# Patient Record
Sex: MALE | URBAN - METROPOLITAN AREA
[De-identification: names, ages, dates, MRNs, and addresses within clinical notes are randomized per-mention and may not be internally consistent; named-entity substitution may affect disease eponyms.]

---

## 2023-06-28 ENCOUNTER — OFFICE VISIT (OUTPATIENT)
Dept: PRIMARY CARE | Facility: CLINIC | Age: 33
End: 2023-06-28
Payer: COMMERCIAL

## 2023-06-28 VITALS
OXYGEN SATURATION: 98 % | DIASTOLIC BLOOD PRESSURE: 70 MMHG | HEIGHT: 76 IN | TEMPERATURE: 97.1 F | HEART RATE: 75 BPM | SYSTOLIC BLOOD PRESSURE: 126 MMHG | WEIGHT: 193 LBS | BODY MASS INDEX: 23.5 KG/M2

## 2023-06-28 DIAGNOSIS — Z20.2 POSSIBLE EXPOSURE TO STD: ICD-10-CM

## 2023-06-28 DIAGNOSIS — Z00.00 ANNUAL PHYSICAL EXAM: Primary | ICD-10-CM

## 2023-06-28 LAB
ERYTHROCYTE DISTRIBUTION WIDTH (RATIO) BY AUTOMATED COUNT: 14 % (ref 11.5–14.5)
ERYTHROCYTE MEAN CORPUSCULAR HEMOGLOBIN CONCENTRATION (G/DL) BY AUTOMATED: 31.6 G/DL (ref 32–36)
ERYTHROCYTE MEAN CORPUSCULAR VOLUME (FL) BY AUTOMATED COUNT: 88 FL (ref 80–100)
ERYTHROCYTES (10*6/UL) IN BLOOD BY AUTOMATED COUNT: 5.83 X10E12/L (ref 4.5–5.9)
HEMATOCRIT (%) IN BLOOD BY AUTOMATED COUNT: 51.3 % (ref 41–52)
HEMOGLOBIN (G/DL) IN BLOOD: 16.2 G/DL (ref 13.5–17.5)
LEUKOCYTES (10*3/UL) IN BLOOD BY AUTOMATED COUNT: 13.9 X10E9/L (ref 4.4–11.3)
NRBC (PER 100 WBCS) BY AUTOMATED COUNT: 0 /100 WBC (ref 0–0)
PLATELETS (10*3/UL) IN BLOOD AUTOMATED COUNT: 259 X10E9/L (ref 150–450)

## 2023-06-28 PROCEDURE — 80053 COMPREHEN METABOLIC PANEL: CPT

## 2023-06-28 PROCEDURE — 86592 SYPHILIS TEST NON-TREP QUAL: CPT

## 2023-06-28 PROCEDURE — 80074 ACUTE HEPATITIS PANEL: CPT

## 2023-06-28 PROCEDURE — 85027 COMPLETE CBC AUTOMATED: CPT

## 2023-06-28 PROCEDURE — 99385 PREV VISIT NEW AGE 18-39: CPT | Performed by: FAMILY MEDICINE

## 2023-06-28 PROCEDURE — 80061 LIPID PANEL: CPT

## 2023-06-28 PROCEDURE — 87389 HIV-1 AG W/HIV-1&-2 AB AG IA: CPT

## 2023-06-28 SDOH — HEALTH STABILITY: PHYSICAL HEALTH: ON AVERAGE, HOW MANY MINUTES DO YOU ENGAGE IN EXERCISE AT THIS LEVEL?: 60 MIN

## 2023-06-28 SDOH — HEALTH STABILITY: PHYSICAL HEALTH: ON AVERAGE, HOW MANY DAYS PER WEEK DO YOU ENGAGE IN MODERATE TO STRENUOUS EXERCISE (LIKE A BRISK WALK)?: 3 DAYS

## 2023-06-28 ASSESSMENT — PAIN SCALES - GENERAL: PAINLEVEL: 0-NO PAIN

## 2023-06-28 NOTE — PROGRESS NOTES
"Subjective   Patient ID: Ab Guiod is a 32 y.o. male who presents for New Patient Visit (Establish care with pcp, pt is fasting. ).    HPI   Lives in [facility]  Diet not great  Review of Systems  Cardiovascular: no chest pain, no edema, no palpitations, and no syncope.   Respiratory: no cough,no shortness of breath, and no wheezing  Gastrointestinal: no abdominal pain, nausea, vomiting or blood in stools  Genitourinary: no dysuria or hematuria  Objective   /70 (BP Location: Left arm, Patient Position: Sitting, BP Cuff Size: Adult)   Pulse 75   Temp 36.2 °C (97.1 °F) (Temporal)   Ht 1.93 m (6' 4\")   Wt 87.5 kg (193 lb)   SpO2 98%   BMI 23.49 kg/m²     Physical Exam  General: Alert, pleasant and in no acute distress  HEENT: Extraocular motor intact, PERRLA  Neck: supple, nontender, no palpable or enlarged nodes, no thyromegaly  Heart: Regular rate and rhythm, no murmurs  Lungs: Clear to auscultation with good air exchange  Chest- symmetric, nontender  Abdomen: Soft, nontender, no palpable mass or organomegaly  Lower extremities: No edema  Neuro: CNs 2-12 grossly intact, full sensation and strength in all extremities  Skin: No atypical rashes or lesions  Assessment/Plan   Problem List Items Addressed This Visit    None  Visit Diagnoses       Annual physical exam    -  Primary    Relevant Orders    Comprehensive Metabolic Panel    CBC    Lipid Panel    HIV 1/2 Antigen/Antibody Screen with Reflex to Confirmation    RPR    Hepatitis panel, acute    Possible exposure to STD        Relevant Orders    HIV 1/2 Antigen/Antibody Screen with Reflex to Confirmation    RPR    Hepatitis panel, acute        Patient here to establish.  We completed an annual checkup.  Examination normal.  We will get routine labs.  In addition, patient would like STD testing.  He has no symptoms so we will get HIV, hep ABC, and RPR.  We will contact patient in the next 3 to 5 days with results.  Encourage patient to " try to establish a better diet.  [place] good choices.  We discussed avenues to success.

## 2023-06-29 LAB
ALANINE AMINOTRANSFERASE (SGPT) (U/L) IN SER/PLAS: 24 U/L (ref 10–52)
ALBUMIN (G/DL) IN SER/PLAS: 4.3 G/DL (ref 3.4–5)
ALKALINE PHOSPHATASE (U/L) IN SER/PLAS: 54 U/L (ref 33–120)
ANION GAP IN SER/PLAS: 14 MMOL/L (ref 10–20)
ASPARTATE AMINOTRANSFERASE (SGOT) (U/L) IN SER/PLAS: 14 U/L (ref 9–39)
BILIRUBIN TOTAL (MG/DL) IN SER/PLAS: 0.5 MG/DL (ref 0–1.2)
CALCIUM (MG/DL) IN SER/PLAS: 10.2 MG/DL (ref 8.6–10.6)
CARBON DIOXIDE, TOTAL (MMOL/L) IN SER/PLAS: 28 MMOL/L (ref 21–32)
CHLORIDE (MMOL/L) IN SER/PLAS: 103 MMOL/L (ref 98–107)
CHOLESTEROL (MG/DL) IN SER/PLAS: 186 MG/DL (ref 0–199)
CHOLESTEROL IN HDL (MG/DL) IN SER/PLAS: 65.1 MG/DL
CHOLESTEROL/HDL RATIO: 2.9
CREATININE (MG/DL) IN SER/PLAS: 1.19 MG/DL (ref 0.5–1.3)
GFR MALE: 83 ML/MIN/1.73M2
GLUCOSE (MG/DL) IN SER/PLAS: 86 MG/DL (ref 74–99)
HEPATITIS A VIRUS IGM AB PRESENCE IN SER/PLAS BY IMMUNOASSAY: NONREACTIVE
HEPATITIS B VIRUS CORE IGM AB PRESENCE IN SER/PLAS BY IMMUNOASSY: NONREACTIVE
HEPATITIS B VIRUS SURFACE AG PRESENCE IN SERUM: NONREACTIVE
HEPATITIS C VIRUS AB PRESENCE IN SERUM: NONREACTIVE
HIV 1/ 2 AG/AB SCREEN: NONREACTIVE
LDL: 89 MG/DL (ref 0–99)
POTASSIUM (MMOL/L) IN SER/PLAS: 4.4 MMOL/L (ref 3.5–5.3)
PROTEIN TOTAL: 7.7 G/DL (ref 6.4–8.2)
RPR MONITORING: NONREACTIVE
SODIUM (MMOL/L) IN SER/PLAS: 141 MMOL/L (ref 136–145)
TRIGLYCERIDE (MG/DL) IN SER/PLAS: 162 MG/DL (ref 0–149)
UREA NITROGEN (MG/DL) IN SER/PLAS: 21 MG/DL (ref 6–23)
VLDL: 32 MG/DL (ref 0–40)

## 2023-12-04 ENCOUNTER — APPOINTMENT (OUTPATIENT)
Dept: PRIMARY CARE | Facility: CLINIC | Age: 33
End: 2023-12-04
Payer: COMMERCIAL

## 2023-12-15 ENCOUNTER — OFFICE VISIT (OUTPATIENT)
Dept: UROLOGY | Facility: HOSPITAL | Age: 33
End: 2023-12-15
Payer: COMMERCIAL

## 2023-12-15 DIAGNOSIS — N41.1 CHRONIC PROSTATITIS: Primary | ICD-10-CM

## 2023-12-15 PROCEDURE — 99214 OFFICE O/P EST MOD 30 MIN: CPT | Performed by: UROLOGY

## 2023-12-15 PROCEDURE — 99204 OFFICE O/P NEW MOD 45 MIN: CPT | Performed by: UROLOGY

## 2023-12-15 RX ORDER — DOXYCYCLINE HYCLATE 100 MG
100 TABLET ORAL 2 TIMES DAILY
Qty: 60 TABLET | Refills: 0 | Status: SHIPPED | OUTPATIENT
Start: 2023-12-15 | End: 2024-01-14

## 2023-12-15 NOTE — PROGRESS NOTES
HPI    33 year old male coming in for 6 weeks of so of perineal discomfort, pain at tip of penis, pain in testicles. Never happened before. No inciting factors. Maybe some increased frequency, but no signficiant bothersome LUTS. No infections or hematuria. Went to urgent care, urines and labs were normal per reprot.         Current Medications:  No current outpatient medications on file.     No current facility-administered medications for this visit.        Active Problems:  Ab Guido is a 33 y.o. male with the following Problems and Medications.  There is no problem list on file for this patient.    No current outpatient medications on file.     No current facility-administered medications for this visit.       PMH:  No past medical history on file.    PSH:  No past surgical history on file.    FMH:  Family History   Problem Relation Name Age of Onset    Breast cancer Mother      Heart disease Maternal Grandmother      COPD Paternal Grandmother      Heart disease Paternal Grandmother      Diabetes Other         SHx:  Social History     Tobacco Use    Smoking status: Some Days     Types: Cigarettes    Smokeless tobacco: Never   Substance Use Topics    Alcohol use: Yes     Comment: Social    Drug use: Never       Allergies:  No Known Allergies      Physical Exam:  General: Well developed, well nourished, alert and cooperative, appears in no acute distress   Eyes: Non-injected conjunctiva, sclera clear, no proptosis   Cardiac: Extremities appear well perfused. No edema, cyanosis or pallor.    Lungs: Breathing is easy, non-labored. Speaking in clear and complete sentences. Normal diaphragmatic movement.   MSK: Ambulatory with steady gait, unassisted   Neuro: alert and oriented to person, place and time   Psych: Demonstrates good judgement and reason, without hallucinations, abnormal affect or abnormal behaviors.   Skin: no obvious lesions, no rashes.         Assessment/Plan  Symptoms chronic noninfectious  prostatitis.  Discussed various forms of prostatitis.  He does not have any symptoms of bacterial prostatitis.  Discussed that the symptoms often come and go, usually will resolve without treatment.  May recur.  Offered observation versus medical therapy, either alpha blockers, anti-inflammatories, antibiotics.  He would like to try antibiotics.  Will start on 1 month of doxycycline 100 mg p.o. twice daily.  Discussed side effects.  Will see back in 8 weeks for telehealth for symptom check.      Scribe Attestation  By signing my name below, I, Marlin Brock , [place]   attest that this documentation has been prepared under the direction and in the presence of Alo Garcia MD.

## 2023-12-20 ENCOUNTER — APPOINTMENT (OUTPATIENT)
Dept: PRIMARY CARE | Facility: CLINIC | Age: 33
End: 2023-12-20
Payer: COMMERCIAL

## 2023-12-22 ENCOUNTER — OFFICE VISIT (OUTPATIENT)
Dept: PRIMARY CARE | Facility: CLINIC | Age: 33
End: 2023-12-22
Payer: COMMERCIAL

## 2023-12-22 VITALS
HEART RATE: 89 BPM | WEIGHT: 202.8 LBS | TEMPERATURE: 97.3 F | OXYGEN SATURATION: 96 % | DIASTOLIC BLOOD PRESSURE: 72 MMHG | SYSTOLIC BLOOD PRESSURE: 122 MMHG | BODY MASS INDEX: 24.69 KG/M2

## 2023-12-22 DIAGNOSIS — F41.9 ANXIETY AND DEPRESSION: Primary | ICD-10-CM

## 2023-12-22 DIAGNOSIS — F32.A ANXIETY AND DEPRESSION: Primary | ICD-10-CM

## 2023-12-22 PROCEDURE — 99214 OFFICE O/P EST MOD 30 MIN: CPT | Performed by: FAMILY MEDICINE

## 2023-12-22 ASSESSMENT — PAIN SCALES - GENERAL: PAINLEVEL: 0-NO PAIN

## 2023-12-22 ASSESSMENT — ENCOUNTER SYMPTOMS: DEPRESSION: 0

## 2023-12-22 NOTE — PROGRESS NOTES
Subjective   Patient ID: Ab Guido is a 33 y.o. male who presents for Follow-up (Consult letter for work. ).    HPI patient here to follow-up on anxiety and depression.  He has been doing very well.  He was struggling this past spring into summer.  Time period: anxiety and depression for over 2 years.  Panic attacks this past summer secondary to gym supplement.  Discontinued in August and has been better with no panic.  Moods have been good since. 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  Neck: Supple, no JVD or carotid bruit  Heart: Regular rate and rhythm, no murmur  Lungs: Clear to auscultation  Lower extremities: No edema    Assessment/Plan   Problem List Items Addressed This Visit    None  Visit Diagnoses         Codes    Anxiety and depression    -  Primary F41.9, F32.A        Patient here for follow-up and completion of work statement.  He had been doing well but developed worsening anxiety with panic due to a supplement.  He has stopped this and feels very good.  Letter was completed and forwarded to patient to use for work.  He is to let us know if there are any further problems.  Otherwise plan follow-up in 6 months

## 2023-12-22 NOTE — LETTER
To whom it concerns,    Mr. Berger is a patient of my medical practice.  He has suffered with some anxiety and depression for over 2 years.  He then developed some panic attacks this past summer secondary to using a gym supplement.  He discontinued this in August and has been better with no more panic attacks.  Moods have been good since the end of summer, he has not had any depression or anxiety and his fatigue problems have resolved.  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    Sincerely,    Alo Sanchez D.O.

## 2024-03-21 PROBLEM — N41.1 CHRONIC PROSTATITIS: Status: ACTIVE | Noted: 2024-03-21

## 2024-06-12 ENCOUNTER — APPOINTMENT (OUTPATIENT)
Dept: PRIMARY CARE | Facility: CLINIC | Age: 34
End: 2024-06-12
Payer: COMMERCIAL